# Patient Record
Sex: FEMALE | Race: WHITE | URBAN - METROPOLITAN AREA
[De-identification: names, ages, dates, MRNs, and addresses within clinical notes are randomized per-mention and may not be internally consistent; named-entity substitution may affect disease eponyms.]

---

## 2017-01-01 ENCOUNTER — TELEPHONE (OUTPATIENT)
Dept: FAMILY MEDICINE CLINIC | Facility: CLINIC | Age: 27
End: 2017-01-01

## 2017-01-01 NOTE — TELEPHONE ENCOUNTER
----- Message from [facility] sent at 12/27/2016  8:55 AM CST -----  Regarding: navigation  Hi Dr. Georgiana Hamman,  I made several attempts to reach your patient; she did reach out to me once but then returning her message I was not able to reach her or

## 2017-02-09 RX ORDER — ESCITALOPRAM OXALATE 5 MG/1
TABLET ORAL
Qty: 30 TABLET | Refills: 2 | Status: SHIPPED | OUTPATIENT
Start: 2017-02-09 | End: 2017-05-08

## 2017-02-09 NOTE — TELEPHONE ENCOUNTER
Refill Protocol Appointment Criteria  · Appointment scheduled in the past 6 months or in the next 3 months  Recent Visits       Provider Department Primary Dx    2 months ago Eric Kingston MD [facility], [facility], [address] Anxiety and depressio

## 2017-05-11 RX ORDER — ESCITALOPRAM OXALATE 5 MG/1
TABLET ORAL
Qty: 30 TABLET | Refills: 2 | Status: SHIPPED | OUTPATIENT
Start: 2017-05-11

## 2017-05-11 NOTE — TELEPHONE ENCOUNTER
Rx request for Escitalopram 5 mg, PASSED Psychiatric-Non-Scheduled (anit- anxiety) Protocol. Rx filled per protocol.     Refill Protocol Appointment Criteria  · Appointment scheduled in the past 6 months or in the next 3 months  Recent Visits       Provider

## 2017-11-08 DIAGNOSIS — Z76.0 MEDICATION REFILL: ICD-10-CM

## 2017-11-08 RX ORDER — DROSPIRENONE AND ETHINYL ESTRADIOL 0.02-3(28)
1 KIT ORAL DAILY
Qty: 28 TABLET | Refills: 10 | OUTPATIENT
Start: 2017-11-08

## 2017-11-13 DIAGNOSIS — Z76.0 MEDICATION REFILL: ICD-10-CM

## 2017-11-13 RX ORDER — DROSPIRENONE AND ETHINYL ESTRADIOL 0.02-3(28)
1 KIT ORAL DAILY
Qty: 28 TABLET | Refills: 10 | OUTPATIENT
Start: 2017-11-13

## 2018-01-08 DIAGNOSIS — Z76.0 MEDICATION REFILL: ICD-10-CM

## 2018-01-09 RX ORDER — DROSPIRENONE AND ETHINYL ESTRADIOL 0.02-3(28)
1 KIT ORAL DAILY
Qty: 28 TABLET | Refills: 11 | OUTPATIENT
Start: 2018-01-09

## 2021-02-06 ENCOUNTER — TELEPHONE (OUTPATIENT)
Dept: SCHEDULING | Age: 31
End: 2021-02-06